# Patient Record
Sex: MALE | Race: WHITE | Employment: FULL TIME | URBAN - METROPOLITAN AREA
[De-identification: names, ages, dates, MRNs, and addresses within clinical notes are randomized per-mention and may not be internally consistent; named-entity substitution may affect disease eponyms.]

---

## 2019-07-27 PROBLEM — I26.99 PULMONARY EMBOLISM (HCC): Status: ACTIVE | Noted: 2019-07-27

## 2019-07-27 PROBLEM — I82.409 DEEP VENOUS THROMBOSIS (HCC): Status: ACTIVE | Noted: 2019-07-27

## 2020-01-08 PROBLEM — E11.628 DIABETIC FOOT INFECTION (HCC): Status: ACTIVE | Noted: 2020-01-08

## 2020-01-08 PROBLEM — E11.10 DKA (DIABETIC KETOACIDOSES): Status: ACTIVE | Noted: 2020-01-08

## 2020-01-08 PROBLEM — L08.9 DIABETIC FOOT INFECTION (HCC): Status: ACTIVE | Noted: 2020-01-08

## 2021-01-01 ENCOUNTER — HOSPITAL ENCOUNTER (EMERGENCY)
Age: 56
End: 2021-10-19
Attending: STUDENT IN AN ORGANIZED HEALTH CARE EDUCATION/TRAINING PROGRAM
Payer: COMMERCIAL

## 2021-01-01 DIAGNOSIS — I46.9 CARDIAC ARREST (HCC): Primary | ICD-10-CM

## 2021-01-01 PROCEDURE — 99283 EMERGENCY DEPT VISIT LOW MDM: CPT

## 2021-01-01 PROCEDURE — 94762 N-INVAS EAR/PLS OXIMTRY CONT: CPT

## 2021-01-01 PROCEDURE — 74011250636 HC RX REV CODE- 250/636: Performed by: GENERAL PRACTICE

## 2021-01-01 PROCEDURE — 92950 HEART/LUNG RESUSCITATION CPR: CPT

## 2021-01-01 PROCEDURE — 74011000250 HC RX REV CODE- 250: Performed by: GENERAL PRACTICE

## 2021-01-01 RX ORDER — SODIUM BICARBONATE 1 MEQ/ML
SYRINGE (ML) INTRAVENOUS
Status: COMPLETED | OUTPATIENT
Start: 2021-01-01 | End: 2021-01-01

## 2021-01-01 RX ORDER — EPINEPHRINE 0.1 MG/ML
INJECTION INTRACARDIAC; INTRAVENOUS
Status: COMPLETED | OUTPATIENT
Start: 2021-01-01 | End: 2021-01-01

## 2021-01-01 RX ADMIN — Medication 1 MG: at 17:34

## 2021-01-01 RX ADMIN — Medication 50 MEQ: at 17:37

## 2021-10-18 NOTE — ED TRIAGE NOTES
Pt is a . Was found wedged beside stearing wheel. Unknown down time. EMS pulled pt from truck and began CPR. EMS gave 3 rounds of epi, 300 of amio and defibrilated x1.  CPR ongoing upon arrival. Pt intubated prior to arrival. Unable to obtain vital signs at this time

## 2021-10-18 NOTE — PROGRESS NOTES
Pt arrived at approximately  [phone] with CPR ongoing , chest compressions and Ambu bagging at 100% by EMS. RT took over  Bagging at this time. Note to have 7.5 ETT in place at 28 at the clamping device from EMS. BS were bilateral with bagging. Unable to regain vital signs,  MD called death at 1.

## 2021-10-18 NOTE — ED PROVIDER NOTES
EMERGENCY DEPARTMENT HISTORY AND PHYSICAL EXAM      Date: 10/18/2021  Patient Name: Vito Theodore    History of Presenting Illness     Chief Complaint   Patient presents with    Cardiac arrest       History (Context): Vito Theodore is a 60 y.o. male with a past medical history significant for obesity, diabetes, GERD, hyperlipidemia, comes into the ED today in cardiac arrest.  Per EMS patient was found wedged behind the steering wheel of the truck. Per the story patient complained of chest pain and felt unwell. Patient called his coworkers and was found to be unresponsive upon EMS arrival.  EMS states that they provided patient epinephrine, CPR, and patient found to be in PEA the majority of the time. Otherwise history and review of systems limited secondary to patient's clinical condition. PCP: Germania Mcguire MD    Current Outpatient Medications   Medication Sig Dispense Refill    apixaban (ELIQUIS) 5 mg (74 tabs) starter pack Take 5 mg by mouth two (2) times a day. 1 Dose Pack 0    insulin glargine (LANTUS U-100 INSULIN) 100 unit/mL injection 80 Units by SubCUTAneous route daily. (Patient taking differently: 80 Units by SubCUTAneous route daily. Patient adjusts Lantus dose (range 50 to 80 units)  Indications: type 2 diabetes mellitus) 1 Vial 0    acetaminophen (TYLENOL) 325 mg tablet Take 2 Tabs by mouth every four (4) hours as needed for Pain. 60 Tab 0    insulin lispro (HUMALOG) 100 unit/mL injection 25 Units by SubCUTAneous route Before breakfast, lunch, and dinner. (Patient taking differently: 25 Units by SubCUTAneous route Before breakfast, lunch, and dinner. Patient takes 25, 30 or 35 units after each meal. This morning he took 50 units. Indications: type 2 diabetes mellitus) 1 Vial 0    levoFLOXacin (LEVAQUIN) 500 mg tablet Take 1 Tab by mouth daily.  7 Tab 0    insulin syringe-needle U-100 (INSULIN SYRINGE) 1 mL 29 gauge x 1/2\" syrg 25 Units by Does Not Apply route Before breakfast, lunch, and dinner. 100 Syringe 0    atorvastatin (LIPITOR) 80 mg tablet TAKE 1 TABLET BY MOUTH ONCE DAILY FOR CHOLESTEROL      Crutch misc None weight left 2 Each 0    OTHER,NON-FORMULARY, 1 mL by IntraCAVernosal route as needed. 5 each 5    Syringe with Needle,Disp, Tray (ALLERGIST TRAY 1CC 27GX1/2\") 1 mL 27 x 1/2\" tray 1 mL by IntraCAVernosal route as needed. 25 each 2    omeprazole (PRILOSEC) 40 mg capsule Take 40 mg by mouth as needed. Past History     Past Medical History:   Past Medical History:   Diagnosis Date    [facility]omyel[facility] of ankle and foot [facility]     BMI 40.0-44.9, adult ([facility]     Diabetes ([facility] approx.     ED (erectile dysfunction)     GERD (gastroesophageal reflux disease)     Hyperlipidemia     high tg/low hdl    Hypoglycemia due to insulin     Frequent low blood glucoses down to 40 mg/dl range due to insulin dose adjustments.  Peripheral neuropathy     Proteinuria     Type 2 diabetes mellitus with renal manifestations (HCC)        Past Surgical History:  Past Surgical History:   Procedure Laterality Date    HX AMPUTATION      Small Toe on Right Foot    HX AMPUTATION      Partial amputation on 2nd and 4th toes on right foot    HX AMPUTATION      Small toe on Left Foot       Family History:  Family History   Problem Relation Age of Onset    Diabetes Mother        Social History:   Social History     Tobacco Use    Smoking status: Former Smoker     Packs/day: 2.00     Years: 30.00     Pack years: 60.00     Quit date: 2015     Years since quittin.3    Smokeless tobacco: Never Used   Substance Use Topics    Alcohol use: Yes     Comment: occasionally    Drug use: No       Allergies:  No Known Allergies    PMH, PSH, family history, social history, allergies reviewed with the patient with significant items noted above.   Review of Systems   Review of Systems   Unable to perform ROS: Patient unresponsive       Physical Exam   There were no vitals filed for this visit.    Physical Exam  Vitals and nursing note reviewed. Constitutional:       Appearance: He is obese. Comments: Unresponsive   HENT:      Head: Normocephalic and atraumatic. Mouth/Throat:      Mouth: Mucous membranes are moist.   Eyes:      General: No scleral icterus. Conjunctiva/sclera: Conjunctivae normal.   Cardiovascular:      Comments: PEA on the monitor, pulseless  Pulmonary:      Comments: Intubated, bag-valve-mask, breath sounds bilaterally  Abdominal:      General: There is no distension. Palpations: Abdomen is soft. Musculoskeletal:         General: No swelling, deformity or signs of injury. Cervical back: Neck supple. Skin:     General: Skin is dry. Coloration: Skin is pale. Findings: No rash. Neurological:      Comments: Unresponsive, not responding to pain, GCS 3   Psychiatric:         Mood and Affect: Mood normal.         Behavior: Behavior normal.         Diagnostic Study Results     Labs -   No results found for this or any previous visit (from the past 12 hour(s)). Labs Reviewed - No data to display    Radiologic Studies -   No orders to display     CT Results  (Last 48 hours)    None        CXR Results  (Last 48 hours)    None          The laboratory results, imaging results, and other diagnostic exams were reviewed in the EMR. Medical Decision Making   I am the first provider for this patient. I reviewed the vital signs, available nursing notes, past medical history, past surgical history, family history and social history. Vital Signs-Reviewed the patient's vital signs. Records Reviewed: Personally, on initial evaluation    MDM:   Esteban Renee presents with complaint of cardiac arrest  DDX includes but is not limited to: MI, PE, cardiac arrest    Upon arrival patient intubated with CPR in progress. Continue to provide patient CPR, epinephrine, and sodium bicarb while here in the emergency department.   On pulse check patient continued to be PEA without any cardiac activity on ultrasound. Time of death was called at 56 . updated patient's family. Orders as below:  Orders Placed This Encounter    EPINEPHrine (ADRENALIN) 0.1 mg/mL syringe    sodium bicarbonate 8.4 % (1 mEq/mL) injection        ED Course:            Procedures:  Procedures        Diagnosis and Disposition     CLINICAL IMPRESSION:  No diagnosis found. Current Discharge Medication List          Disposition:       Critical Care Time:   The services I provided to this patient were to treat and/or prevent clinically significant deterioration that could result in the failure of one or more body systems and/or organ systems due to cardiac arrest.    Services included the following:  -reviewing nursing notes and old charts  -vital sign assessments  -direct patient care  -medication orders and management  -interpreting and reviewing diagnostic studies/labs  -re-evaluations  -documentation time    Aggregate critical care time was 31 minutes, which includes only time during which I was engaged in work directly related to the patient's care as described above, whether I was at bedside or elsewhere in the Emergency Department. It did not include time spent performing other reported procedures or the services of residents, students, nurses, or advance practice providers. Valerie Ariza D.O.    7:13 PM          Dragon Disclaimer     Please note that this dictation was completed with [employer], the computer voice recognition software. Quite often unanticipated grammatical, syntax, homophones, and other interpretive errors are inadvertently transcribed by the computer software. Please disregard these errors. Please excuse any errors that have escaped final proofreading. Valerie AVENDANO

## 2021-10-19 NOTE — PROGRESS NOTES
Bereavement Note:     responded to the death of Kaylee Price, who is a 60 y.o., male, offering Spiritual Care to patient and family, see flow sheets for interventions. Date of Death: 10/18/21    Extended Emergency Contact Information  Primary Emergency Contact: [address] [facility] Phone: [phone]  Mobile Phone: [phone]  Relation: Girlfriend  Secondary Emergency Contact: [address] [facility] Phone: [phone]  Mobile Phone: [phone]  Relation: Parent                 YES      NO  UNKNOWN  Life [employer]   []        []    [x]   [employer]   [] [] [x]   Medical Examiner  []        []  [x]   Going to [employer]  []        [] [x]      Autopsy   []        []         [x]   Sympathy Card  []        [x]  Bereavement Materials  [x]        []           Business Card Provided  [x]        []              Home: [facility]                            [address], [address] [place] [address]                            [phone]    Chaplains will continue to follow family and will provide spiritual care as needed. Nelida Villa MDiv.   Cheko Moura   [phone]

## 2021-10-19 NOTE — PROGRESS NOTES
visited with the family of Marycarmen Pineda, who is a 60 y. o.,male. The  provided the following Interventions:  Initiated a relationship of care and support. Offered prayer and assurance of continued prayers on patient's behalf. Plan:  Chaplains will continue to follow and will provide pastoral care on an as needed/requested basis.  recommends bedside caregivers page  on duty if patient shows signs of acute spiritual or emotional distress.     Rodney Mathew   [phone]
